# Patient Record
Sex: MALE | Race: WHITE | URBAN - METROPOLITAN AREA
[De-identification: names, ages, dates, MRNs, and addresses within clinical notes are randomized per-mention and may not be internally consistent; named-entity substitution may affect disease eponyms.]

---

## 2020-07-06 ENCOUNTER — TELEPHONE ENCOUNTER (OUTPATIENT)
Dept: URBAN - METROPOLITAN AREA CLINIC 105 | Facility: CLINIC | Age: 75
End: 2020-07-06

## 2020-07-29 ENCOUNTER — OFFICE VISIT (OUTPATIENT)
Dept: URBAN - METROPOLITAN AREA TELEHEALTH 2 | Facility: TELEHEALTH | Age: 75
End: 2020-07-29

## 2020-08-04 ENCOUNTER — OFFICE VISIT (OUTPATIENT)
Dept: URBAN - METROPOLITAN AREA TELEHEALTH 2 | Facility: TELEHEALTH | Age: 75
End: 2020-08-04

## 2020-08-07 ENCOUNTER — OFFICE VISIT (OUTPATIENT)
Dept: URBAN - METROPOLITAN AREA CLINIC 105 | Facility: CLINIC | Age: 75
End: 2020-08-07

## 2020-10-28 ENCOUNTER — OUT OF OFFICE VISIT (OUTPATIENT)
Dept: URBAN - METROPOLITAN AREA MEDICAL CENTER 33 | Facility: MEDICAL CENTER | Age: 75
End: 2020-10-28
Payer: MEDICARE

## 2020-10-28 DIAGNOSIS — N17.8 OTHER ACUTE KIDNEY FAILURE: ICD-10-CM

## 2020-10-28 DIAGNOSIS — D64.89 ANEMIA DUE TO OTHER CAUSE: ICD-10-CM

## 2020-10-28 DIAGNOSIS — K92.0 BLOODY EMESIS: ICD-10-CM

## 2020-10-28 PROCEDURE — 99223 1ST HOSP IP/OBS HIGH 75: CPT | Performed by: INTERNAL MEDICINE

## 2020-10-28 PROCEDURE — 99233 SBSQ HOSP IP/OBS HIGH 50: CPT | Performed by: INTERNAL MEDICINE

## 2020-10-28 PROCEDURE — G8427 DOCREV CUR MEDS BY ELIG CLIN: HCPCS | Performed by: INTERNAL MEDICINE

## 2020-10-29 ENCOUNTER — OUT OF OFFICE VISIT (OUTPATIENT)
Dept: URBAN - METROPOLITAN AREA MEDICAL CENTER 33 | Facility: MEDICAL CENTER | Age: 75
End: 2020-10-29
Payer: MEDICARE

## 2020-10-29 DIAGNOSIS — K31.89 ACQUIRED DEFORMITY OF DUODENUM: ICD-10-CM

## 2020-10-29 DIAGNOSIS — K22.2 ACQUIRED ESOPHAGEAL RING: ICD-10-CM

## 2020-10-29 PROCEDURE — 43239 EGD BIOPSY SINGLE/MULTIPLE: CPT | Performed by: INTERNAL MEDICINE

## 2020-10-29 PROCEDURE — 43249 ESOPH EGD DILATION <30 MM: CPT | Performed by: INTERNAL MEDICINE

## 2020-11-23 ENCOUNTER — TELEPHONE ENCOUNTER (OUTPATIENT)
Dept: URBAN - METROPOLITAN AREA CLINIC 92 | Facility: CLINIC | Age: 75
End: 2020-11-23

## 2020-12-02 ENCOUNTER — OUT OF OFFICE VISIT (OUTPATIENT)
Dept: URBAN - METROPOLITAN AREA MEDICAL CENTER 33 | Facility: MEDICAL CENTER | Age: 75
End: 2020-12-02
Payer: MEDICARE

## 2020-12-02 DIAGNOSIS — K21.9 ACID REFLUX: ICD-10-CM

## 2020-12-02 DIAGNOSIS — K59.09 CHRONIC CONSTIPATION: ICD-10-CM

## 2020-12-02 DIAGNOSIS — Z87.19 H/O DIVERTICULITIS OF COLON: ICD-10-CM

## 2020-12-02 DIAGNOSIS — R18.8 ABDOMINAL FLUID COLLECTION: ICD-10-CM

## 2020-12-02 PROCEDURE — 99223 1ST HOSP IP/OBS HIGH 75: CPT | Performed by: INTERNAL MEDICINE

## 2020-12-02 PROCEDURE — G8427 DOCREV CUR MEDS BY ELIG CLIN: HCPCS | Performed by: INTERNAL MEDICINE

## 2020-12-03 ENCOUNTER — OUT OF OFFICE VISIT (OUTPATIENT)
Dept: URBAN - METROPOLITAN AREA MEDICAL CENTER 33 | Facility: MEDICAL CENTER | Age: 75
End: 2020-12-03
Payer: MEDICARE

## 2020-12-03 DIAGNOSIS — Z87.19 H/O ACUTE PANCREATITIS: ICD-10-CM

## 2020-12-03 DIAGNOSIS — K21.9 ACID REFLUX: ICD-10-CM

## 2020-12-03 PROCEDURE — 43249 ESOPH EGD DILATION <30 MM: CPT | Performed by: INTERNAL MEDICINE

## 2020-12-11 ENCOUNTER — TELEPHONE ENCOUNTER (OUTPATIENT)
Dept: URBAN - METROPOLITAN AREA CLINIC 92 | Facility: CLINIC | Age: 75
End: 2020-12-11

## 2020-12-15 ENCOUNTER — TELEPHONE ENCOUNTER (OUTPATIENT)
Dept: URBAN - METROPOLITAN AREA CLINIC 105 | Facility: CLINIC | Age: 75
End: 2020-12-15

## 2020-12-15 ENCOUNTER — OFFICE VISIT (OUTPATIENT)
Dept: URBAN - METROPOLITAN AREA TELEHEALTH 2 | Facility: TELEHEALTH | Age: 75
End: 2020-12-15
Payer: MEDICARE

## 2020-12-15 VITALS — WEIGHT: 197.8 LBS | HEIGHT: 72 IN | BODY MASS INDEX: 26.79 KG/M2

## 2020-12-15 DIAGNOSIS — I25.9 CAD (CORONARY ARTERY DISEASE): ICD-10-CM

## 2020-12-15 DIAGNOSIS — N18.9 CKD (CHRONIC KIDNEY DISEASE): ICD-10-CM

## 2020-12-15 DIAGNOSIS — R18.8 ASCITES: ICD-10-CM

## 2020-12-15 DIAGNOSIS — K21.9 GERD: ICD-10-CM

## 2020-12-15 PROBLEM — 53741008 CORONARY ARTERY DISEASE: Status: ACTIVE | Noted: 2020-12-15

## 2020-12-15 PROBLEM — 389026000 ASCITES: Status: ACTIVE | Noted: 2020-12-15

## 2020-12-15 PROBLEM — 267440005 MONOCLONAL PARAPROTEINEMIA: Status: ACTIVE | Noted: 2020-12-15

## 2020-12-15 PROBLEM — 14760008 CONSTIPATION: Status: ACTIVE | Noted: 2020-12-15

## 2020-12-15 PROBLEM — 709044004 CHRONIC KIDNEY DISEASE: Status: ACTIVE | Noted: 2020-12-15

## 2020-12-15 PROBLEM — 63305008 ESOPHAGEAL STRICTURE: Status: ACTIVE | Noted: 2020-12-15

## 2020-12-15 PROCEDURE — 3017F COLORECTAL CA SCREEN DOC REV: CPT | Performed by: INTERNAL MEDICINE

## 2020-12-15 PROCEDURE — 1036F TOBACCO NON-USER: CPT | Performed by: INTERNAL MEDICINE

## 2020-12-15 PROCEDURE — 99214 OFFICE O/P EST MOD 30 MIN: CPT | Performed by: INTERNAL MEDICINE

## 2020-12-15 PROCEDURE — G8417 CALC BMI ABV UP PARAM F/U: HCPCS | Performed by: INTERNAL MEDICINE

## 2020-12-15 PROCEDURE — G8427 DOCREV CUR MEDS BY ELIG CLIN: HCPCS | Performed by: INTERNAL MEDICINE

## 2020-12-15 PROCEDURE — G8482 FLU IMMUNIZE ORDER/ADMIN: HCPCS | Performed by: INTERNAL MEDICINE

## 2020-12-15 PROCEDURE — G9903 PT SCRN TBCO ID AS NON USER: HCPCS | Performed by: INTERNAL MEDICINE

## 2020-12-15 RX ORDER — DOCUSATE SODIUM 100 MG/1
TAKE 1 CAPSULE (100 MG) BY ORAL ROUTE ONCE DAILY CAPSULE ORAL 1
Qty: 0 | Refills: 0 | Status: ACTIVE | COMMUNITY
Start: 1900-01-01

## 2020-12-15 RX ORDER — LEVOTHYROXINE SODIUM 25 UG/1
TAKE ONE-HALF TABLET (12.5 MCG) BY ORAL ROUTE ONCE DAILY TABLET ORAL 1
Qty: 0 | Refills: 0 | Status: ACTIVE | COMMUNITY
Start: 1900-01-01

## 2020-12-15 RX ORDER — FLUTICASONE FUROATE AND VILANTEROL TRIFENATATE 100; 25 UG/1; UG/1
INHALE 1 PUFF BY INHALATION ROUTE ONCE DAILY AT THE SAME TIME EACH DAY POWDER RESPIRATORY (INHALATION) 1
Qty: 1 | Refills: 0 | Status: ACTIVE | COMMUNITY
Start: 1900-01-01

## 2020-12-15 RX ORDER — ONDANSETRON 4 MG/1
TABLET, ORALLY DISINTEGRATING ORAL
Qty: 0 | Refills: 0 | Status: ACTIVE | COMMUNITY
Start: 1900-01-01

## 2020-12-15 RX ORDER — IPRATROPIUM BROMIDE 0.5 MG/2.5ML
SOLUTION RESPIRATORY (INHALATION)
Qty: 0 | Refills: 0 | Status: ACTIVE | COMMUNITY
Start: 1900-01-01

## 2020-12-15 RX ORDER — ATORVASTATIN CALCIUM 40 MG/1
TAKE 1 TABLET (40 MG) BY ORAL ROUTE ONCE DAILY AT BEDTIME TABLET, FILM COATED ORAL 1
Qty: 0 | Refills: 0 | Status: ACTIVE | COMMUNITY
Start: 1900-01-01

## 2020-12-15 RX ORDER — MODAFINIL 200 MG/1
TAKE 1 TABLET (200 MG) BY ORAL ROUTE ONCE DAILY IN THE MORNING TABLET ORAL 1
Qty: 0 | Refills: 0 | Status: ACTIVE | COMMUNITY
Start: 1900-01-01

## 2020-12-15 RX ORDER — OXYCODONE AND ACETAMINOPHEN 10; 325 MG/1; MG/1
TAKE 1 TABLET BY ORAL ROUTE EVERY 6 HOURS AS NEEDED TABLET ORAL
Qty: 0 | Refills: 0 | Status: ACTIVE | COMMUNITY
Start: 1900-01-01

## 2020-12-15 RX ORDER — MAGNESIUM HYDROXIDE 400 MG/5ML
SUSPENSION ORAL
Qty: 0 | Refills: 0 | Status: ACTIVE | COMMUNITY
Start: 1900-01-01

## 2020-12-15 RX ORDER — AMIODARONE HYDROCHLORIDE 200 MG/1
TAKE 1 TABLET (200 MG) BY ORAL ROUTE ONCE DAILY FOR 30 DAYS TABLET ORAL 1
Qty: 30 | Refills: 0 | Status: ACTIVE | COMMUNITY
Start: 1900-01-01

## 2020-12-15 RX ORDER — PANTOPRAZOLE SODIUM 40 MG/1
TAKE 1 TABLET EVERY DAY TABLET, DELAYED RELEASE ORAL
Qty: 90 | Refills: 0 | Status: ACTIVE | COMMUNITY
Start: 2019-11-14

## 2020-12-15 RX ORDER — NITROGLYCERIN 0.4 MG/1
TABLET SUBLINGUAL
Qty: 0 | Refills: 0 | Status: ACTIVE | COMMUNITY
Start: 1900-01-01

## 2020-12-15 RX ORDER — PANTOPRAZOLE SODIUM 40 MG/1
TAKE 1 TABLET EVERY DAY TABLET, DELAYED RELEASE ORAL
Qty: 90 | Refills: 2
Start: 2019-11-14

## 2020-12-15 RX ORDER — MIDODRINE HYDROCHLORIDE 10 MG/1
TAKE 1 TABLET (10 MG) BY ORAL ROUTE 3 TIMES PER DAY TABLET ORAL
Qty: 0 | Refills: 0 | Status: ACTIVE | COMMUNITY
Start: 1900-01-01

## 2020-12-15 RX ORDER — INSULIN DETEMIR 100 [IU]/ML
INJECT BY SUBCUTANEOUS ROUTE PER PRESCRIBER'S INSTRUCTIONS. INSULIN DOSING REQUIRES INDIVIDUALIZATION INJECTION, SOLUTION SUBCUTANEOUS
Qty: 1 | Refills: 0 | Status: ACTIVE | COMMUNITY
Start: 1900-01-01

## 2020-12-15 RX ORDER — GABAPENTIN 100 MG/1
TAKE 1 CAPSULE (100 MG) BY ORAL ROUTE ONCE DAILY CAPSULE ORAL 1
Qty: 0 | Refills: 0 | Status: ACTIVE | COMMUNITY
Start: 1900-01-01

## 2020-12-15 RX ORDER — EZETIMIBE 10 MG/1
TAKE 1 TABLET (10 MG) BY ORAL ROUTE ONCE DAILY TABLET ORAL 1
Qty: 0 | Refills: 0 | Status: ACTIVE | COMMUNITY
Start: 1900-01-01

## 2020-12-15 RX ORDER — ALPRAZOLAM 0.5 MG
TAKE 1 TABLET (0.5 MG) BY ORAL ROUTE 3 TIMES PER DAY TABLET ORAL
Qty: 0 | Refills: 0 | Status: ACTIVE | COMMUNITY
Start: 1900-01-01

## 2020-12-15 RX ORDER — ASPIRIN 81 MG/1
TAKE 1 TABLET (81 MG) BY ORAL ROUTE ONCE DAILY TABLET, COATED ORAL 1
Qty: 0 | Refills: 0 | Status: ACTIVE | COMMUNITY
Start: 1900-01-01

## 2020-12-15 RX ORDER — FUROSEMIDE 100 MG/10ML
TAKE 2 MILLILITERS (20 MG) BY ORAL ROUTE ONCE DAILY INJECTION INTRAMUSCULAR; INTRAVENOUS 1
Qty: 0 | Refills: 0 | Status: ACTIVE | COMMUNITY
Start: 1900-01-01

## 2020-12-15 RX ORDER — BROMOCRIPTINE MESYLATE 2.5 MG/1
TABLET ORAL
Qty: 0 | Refills: 0 | Status: ACTIVE | COMMUNITY
Start: 1900-01-01

## 2020-12-15 RX ORDER — FLUCONAZOLE 100 MG/1
TAKE 1 TABLET (100 MG) BY ORAL ROUTE ONCE DAILY TABLET ORAL 1
Qty: 0 | Refills: 0 | Status: ACTIVE | COMMUNITY
Start: 1900-01-01

## 2020-12-15 RX ORDER — POTASSIUM CHLORIDE 1.5 G/15ML
SOLUTION ORAL
Qty: 0 | Refills: 0 | Status: ACTIVE | COMMUNITY
Start: 1900-01-01

## 2020-12-15 NOTE — HPI-TODAY'S VISIT:
the patient comes to the office by telehealth today.  He was just in the hospital for volume overload.  I did upper GI endoscopy while he was admitted there.  He needed a transesophageal echocardiogram.  He has bad valvular heart disease which is creating problems with congestive heart failure.  He also has accumulated cardiac ascites which was drained in the hospital.  The patient's eye is doing well from a GI perspective.  He continues on pantoprazole and states he needs a refill on that.  He does have some problems with chronic kidney disease.  In the hospital it was determined that he may have some issues with multiple myeloma.  He is seeing Hematology about that.  He did undergo a bone survey along with a bone marrow aspirate and biopsy.

## 2021-03-16 ENCOUNTER — OFFICE VISIT (OUTPATIENT)
Dept: URBAN - METROPOLITAN AREA CLINIC 105 | Facility: CLINIC | Age: 76
End: 2021-03-16
Payer: MEDICARE

## 2021-03-16 VITALS
BODY MASS INDEX: 23.76 KG/M2 | SYSTOLIC BLOOD PRESSURE: 116 MMHG | WEIGHT: 175.4 LBS | DIASTOLIC BLOOD PRESSURE: 69 MMHG | HEART RATE: 88 BPM | HEIGHT: 72 IN | TEMPERATURE: 96.9 F

## 2021-03-16 DIAGNOSIS — Z98.890 HISTORY OF TRACHEOSTOMY: ICD-10-CM

## 2021-03-16 DIAGNOSIS — E11.51 TYPE 2 DIABETES MELLITUS WITH DIABETIC PERIPHERAL ANGIOPATHY WITHOUT GANGRENE: ICD-10-CM

## 2021-03-16 DIAGNOSIS — I38 VALVULAR HEART DISEASE: ICD-10-CM

## 2021-03-16 DIAGNOSIS — N18.9 CHRONIC KIDNEY DISEASE, UNSPECIFIED CKD STAGE: ICD-10-CM

## 2021-03-16 DIAGNOSIS — Z79.4 LONG TERM (CURRENT) USE OF INSULIN: ICD-10-CM

## 2021-03-16 PROBLEM — 710815001: Status: ACTIVE | Noted: 2021-03-16

## 2021-03-16 PROBLEM — 709044004: Status: ACTIVE | Noted: 2021-03-16

## 2021-03-16 PROBLEM — 314902007: Status: ACTIVE | Noted: 2021-03-16

## 2021-03-16 PROCEDURE — 99214 OFFICE O/P EST MOD 30 MIN: CPT | Performed by: INTERNAL MEDICINE

## 2021-03-16 RX ORDER — EZETIMIBE 10 MG/1
TAKE 1 TABLET (10 MG) BY ORAL ROUTE ONCE DAILY TABLET ORAL 1
Qty: 0 | Refills: 0 | Status: ACTIVE | COMMUNITY
Start: 1900-01-01

## 2021-03-16 RX ORDER — NITROGLYCERIN 0.4 MG/1
TABLET SUBLINGUAL
Qty: 0 | Refills: 0 | Status: ACTIVE | COMMUNITY
Start: 1900-01-01

## 2021-03-16 RX ORDER — FLUTICASONE FUROATE AND VILANTEROL TRIFENATATE 100; 25 UG/1; UG/1
INHALE 1 PUFF BY INHALATION ROUTE ONCE DAILY AT THE SAME TIME EACH DAY POWDER RESPIRATORY (INHALATION) 1
Qty: 1 | Refills: 0 | Status: ACTIVE | COMMUNITY
Start: 1900-01-01

## 2021-03-16 RX ORDER — ONDANSETRON 4 MG/1
TABLET, ORALLY DISINTEGRATING ORAL
Qty: 0 | Refills: 0 | Status: ACTIVE | COMMUNITY
Start: 1900-01-01

## 2021-03-16 RX ORDER — PANTOPRAZOLE SODIUM 40 MG/1
TAKE 1 TABLET EVERY DAY TABLET, DELAYED RELEASE ORAL
Qty: 90 | Refills: 2 | Status: ACTIVE | COMMUNITY
Start: 2019-11-14

## 2021-03-16 RX ORDER — OXYCODONE AND ACETAMINOPHEN 10; 325 MG/1; MG/1
TAKE 1 TABLET BY ORAL ROUTE EVERY 6 HOURS AS NEEDED TABLET ORAL
Qty: 0 | Refills: 0 | Status: ACTIVE | COMMUNITY
Start: 1900-01-01

## 2021-03-16 RX ORDER — POTASSIUM CHLORIDE 1.5 G/15ML
SOLUTION ORAL
Qty: 0 | Refills: 0 | Status: ACTIVE | COMMUNITY
Start: 1900-01-01

## 2021-03-16 RX ORDER — FLUCONAZOLE 100 MG/1
TAKE 1 TABLET (100 MG) BY ORAL ROUTE ONCE DAILY TABLET ORAL 1
Qty: 0 | Refills: 0 | Status: ACTIVE | COMMUNITY
Start: 1900-01-01

## 2021-03-16 RX ORDER — MIDODRINE HYDROCHLORIDE 10 MG/1
TAKE 1 TABLET (10 MG) BY ORAL ROUTE 3 TIMES PER DAY TABLET ORAL
Qty: 0 | Refills: 0 | Status: ACTIVE | COMMUNITY
Start: 1900-01-01

## 2021-03-16 RX ORDER — IPRATROPIUM BROMIDE 0.5 MG/2.5ML
SOLUTION RESPIRATORY (INHALATION)
Qty: 0 | Refills: 0 | Status: ACTIVE | COMMUNITY
Start: 1900-01-01

## 2021-03-16 RX ORDER — DOCUSATE SODIUM 100 MG/1
TAKE 1 CAPSULE (100 MG) BY ORAL ROUTE ONCE DAILY CAPSULE ORAL 1
Qty: 0 | Refills: 0 | Status: ACTIVE | COMMUNITY
Start: 1900-01-01

## 2021-03-16 RX ORDER — ATORVASTATIN CALCIUM 40 MG/1
TAKE 1 TABLET (40 MG) BY ORAL ROUTE ONCE DAILY AT BEDTIME TABLET, FILM COATED ORAL 1
Qty: 0 | Refills: 0 | Status: ACTIVE | COMMUNITY
Start: 1900-01-01

## 2021-03-16 RX ORDER — MODAFINIL 200 MG/1
TAKE 1 TABLET (200 MG) BY ORAL ROUTE ONCE DAILY IN THE MORNING TABLET ORAL 1
Qty: 0 | Refills: 0 | Status: ACTIVE | COMMUNITY
Start: 1900-01-01

## 2021-03-16 RX ORDER — FUROSEMIDE 100 MG/10ML
TAKE 2 MILLILITERS (20 MG) BY ORAL ROUTE ONCE DAILY INJECTION INTRAMUSCULAR; INTRAVENOUS 1
Qty: 0 | Refills: 0 | Status: ACTIVE | COMMUNITY
Start: 1900-01-01

## 2021-03-16 RX ORDER — LEVOTHYROXINE SODIUM 25 UG/1
TAKE ONE-HALF TABLET (12.5 MCG) BY ORAL ROUTE ONCE DAILY TABLET ORAL 1
Qty: 0 | Refills: 0 | Status: ACTIVE | COMMUNITY
Start: 1900-01-01

## 2021-03-16 RX ORDER — ASPIRIN 81 MG/1
TAKE 1 TABLET (81 MG) BY ORAL ROUTE ONCE DAILY TABLET, COATED ORAL 1
Qty: 0 | Refills: 0 | Status: ACTIVE | COMMUNITY
Start: 1900-01-01

## 2021-03-16 RX ORDER — INSULIN DETEMIR 100 [IU]/ML
INJECT BY SUBCUTANEOUS ROUTE PER PRESCRIBER'S INSTRUCTIONS. INSULIN DOSING REQUIRES INDIVIDUALIZATION INJECTION, SOLUTION SUBCUTANEOUS
Qty: 1 | Refills: 0 | Status: ACTIVE | COMMUNITY
Start: 1900-01-01

## 2021-03-16 RX ORDER — ALPRAZOLAM 0.5 MG
TAKE 1 TABLET (0.5 MG) BY ORAL ROUTE 3 TIMES PER DAY TABLET ORAL
Qty: 0 | Refills: 0 | Status: ACTIVE | COMMUNITY
Start: 1900-01-01

## 2021-03-16 RX ORDER — AMIODARONE HYDROCHLORIDE 200 MG/1
TAKE 1 TABLET (200 MG) BY ORAL ROUTE ONCE DAILY FOR 30 DAYS TABLET ORAL 1
Qty: 30 | Refills: 0 | Status: ACTIVE | COMMUNITY
Start: 1900-01-01

## 2021-03-16 RX ORDER — MAGNESIUM HYDROXIDE 400 MG/5ML
SUSPENSION ORAL
Qty: 0 | Refills: 0 | Status: ACTIVE | COMMUNITY
Start: 1900-01-01

## 2021-03-16 RX ORDER — GABAPENTIN 100 MG/1
TAKE 1 CAPSULE (100 MG) BY ORAL ROUTE ONCE DAILY CAPSULE ORAL 1
Qty: 0 | Refills: 0 | Status: ACTIVE | COMMUNITY
Start: 1900-01-01

## 2021-03-16 RX ORDER — BROMOCRIPTINE MESYLATE 2.5 MG/1
TABLET ORAL
Qty: 0 | Refills: 0 | Status: ACTIVE | COMMUNITY
Start: 1900-01-01

## 2021-03-23 ENCOUNTER — TELEPHONE ENCOUNTER (OUTPATIENT)
Dept: URBAN - METROPOLITAN AREA CLINIC 105 | Facility: CLINIC | Age: 76
End: 2021-03-23

## 2021-03-23 RX ORDER — PANTOPRAZOLE SODIUM 40 MG/1
TAKE 1 TABLET EVERY DAY TABLET, DELAYED RELEASE ORAL
Qty: 90 | Refills: 2
Start: 2019-11-14

## 2021-05-24 ENCOUNTER — WEB ENCOUNTER (OUTPATIENT)
Dept: URBAN - METROPOLITAN AREA CLINIC 105 | Facility: CLINIC | Age: 76
End: 2021-05-24

## 2021-05-24 RX ORDER — PANTOPRAZOLE SODIUM 40 MG/1
TAKE 1 TABLET EVERY DAY TABLET, DELAYED RELEASE ORAL ONCE A DAY
Qty: 90 TABLET | Refills: 3

## 2021-05-25 ENCOUNTER — TELEPHONE ENCOUNTER (OUTPATIENT)
Dept: URBAN - METROPOLITAN AREA CLINIC 105 | Facility: CLINIC | Age: 76
End: 2021-05-25

## 2021-05-25 RX ORDER — PANTOPRAZOLE SODIUM 40 MG/1
TAKE 1 TABLET EVERY DAY TABLET, DELAYED RELEASE ORAL ONCE A DAY
Qty: 90 TABLET | Refills: 3

## 2021-11-19 ENCOUNTER — ERX REFILL RESPONSE (OUTPATIENT)
Dept: RURAL CLINIC 2 | Facility: CLINIC | Age: 76
End: 2021-11-19

## 2021-11-19 RX ORDER — PANTOPRAZOLE SODIUM 40 MG/1
TAKE 1 TABLET EVERY DAY TABLET, DELAYED RELEASE ORAL
Qty: 90 TABLET | Refills: 0 | OUTPATIENT

## 2021-11-19 RX ORDER — PANTOPRAZOLE SODIUM 40 MG/1
TAKE 1 TABLET EVERY DAY TABLET, DELAYED RELEASE ORAL
Qty: 90 TABLET | Refills: 4 | OUTPATIENT

## 2022-03-28 ENCOUNTER — WEB ENCOUNTER (OUTPATIENT)
Dept: URBAN - METROPOLITAN AREA CLINIC 105 | Facility: CLINIC | Age: 77
End: 2022-03-28

## 2022-03-31 ENCOUNTER — WEB ENCOUNTER (OUTPATIENT)
Dept: URBAN - METROPOLITAN AREA CLINIC 105 | Facility: CLINIC | Age: 77
End: 2022-03-31

## 2022-03-31 ENCOUNTER — TELEPHONE ENCOUNTER (OUTPATIENT)
Dept: URBAN - METROPOLITAN AREA CLINIC 105 | Facility: CLINIC | Age: 77
End: 2022-03-31

## 2022-03-31 ENCOUNTER — LAB OUTSIDE AN ENCOUNTER (OUTPATIENT)
Dept: URBAN - METROPOLITAN AREA CLINIC 105 | Facility: CLINIC | Age: 77
End: 2022-03-31

## 2022-03-31 ENCOUNTER — OFFICE VISIT (OUTPATIENT)
Dept: URBAN - METROPOLITAN AREA CLINIC 105 | Facility: CLINIC | Age: 77
End: 2022-03-31
Payer: MEDICARE

## 2022-03-31 ENCOUNTER — DASHBOARD ENCOUNTERS (OUTPATIENT)
Age: 77
End: 2022-03-31

## 2022-03-31 VITALS
HEART RATE: 99 BPM | BODY MASS INDEX: 26.57 KG/M2 | TEMPERATURE: 98.1 F | HEIGHT: 72 IN | SYSTOLIC BLOOD PRESSURE: 125 MMHG | WEIGHT: 196.2 LBS | DIASTOLIC BLOOD PRESSURE: 73 MMHG

## 2022-03-31 DIAGNOSIS — I38 VALVULAR HEART DISEASE: ICD-10-CM

## 2022-03-31 DIAGNOSIS — K21.9 GERD WITHOUT ESOPHAGITIS: ICD-10-CM

## 2022-03-31 DIAGNOSIS — I10 HTN (HYPERTENSION), BENIGN: ICD-10-CM

## 2022-03-31 DIAGNOSIS — E03.9 ACQUIRED HYPOTHYROIDISM: ICD-10-CM

## 2022-03-31 DIAGNOSIS — R18.8 OTHER ASCITES: ICD-10-CM

## 2022-03-31 DIAGNOSIS — Z95.0 PACEMAKER: ICD-10-CM

## 2022-03-31 DIAGNOSIS — D69.6 THROMBOCYTOPENIA: ICD-10-CM

## 2022-03-31 DIAGNOSIS — I25.10 CORONARY ARTERY DISEASE INVOLVING NATIVE CORONARY ARTERY OF NATIVE HEART WITHOUT ANGINA PECTORIS: ICD-10-CM

## 2022-03-31 DIAGNOSIS — E72.20 HYPERAMMONEMIA: ICD-10-CM

## 2022-03-31 DIAGNOSIS — K72.90 HEPATIC ENCEPHALOPATHY: ICD-10-CM

## 2022-03-31 PROBLEM — 389026000: Status: ACTIVE | Noted: 2022-03-31

## 2022-03-31 PROBLEM — 111566002: Status: ACTIVE | Noted: 2022-03-31

## 2022-03-31 PROBLEM — 9360008: Status: ACTIVE | Noted: 2022-03-31

## 2022-03-31 PROBLEM — 441509002: Status: ACTIVE | Noted: 2022-03-31

## 2022-03-31 PROBLEM — 415116008: Status: ACTIVE | Noted: 2022-03-31

## 2022-03-31 PROBLEM — 368009 VALVULAR HEART DISEASE: Status: ACTIVE | Noted: 2020-12-15

## 2022-03-31 PROBLEM — 443502000: Status: ACTIVE | Noted: 2022-03-31

## 2022-03-31 PROBLEM — 10725009: Status: ACTIVE | Noted: 2022-03-31

## 2022-03-31 PROBLEM — 266435005: Status: ACTIVE | Noted: 2022-03-31

## 2022-03-31 PROCEDURE — 99215 OFFICE O/P EST HI 40 MIN: CPT | Performed by: INTERNAL MEDICINE

## 2022-03-31 RX ORDER — ALPRAZOLAM 0.5 MG
TAKE 1 TABLET (0.5 MG) BY ORAL ROUTE 3 TIMES PER DAY TABLET ORAL
Qty: 0 | Refills: 0 | Status: ON HOLD | COMMUNITY
Start: 1900-01-01

## 2022-03-31 RX ORDER — MODAFINIL 200 MG/1
TAKE 1 TABLET (200 MG) BY ORAL ROUTE ONCE DAILY IN THE MORNING TABLET ORAL 1
Qty: 0 | Refills: 0 | Status: ON HOLD | COMMUNITY
Start: 1900-01-01

## 2022-03-31 RX ORDER — ATORVASTATIN CALCIUM 40 MG/1
TAKE 1 TABLET (40 MG) BY ORAL ROUTE ONCE DAILY AT BEDTIME TABLET, FILM COATED ORAL 1
Qty: 0 | Refills: 0 | Status: ON HOLD | COMMUNITY
Start: 1900-01-01

## 2022-03-31 RX ORDER — LEVOTHYROXINE SODIUM 25 UG/1
TAKE ONE-HALF TABLET (12.5 MCG) BY ORAL ROUTE ONCE DAILY TABLET ORAL 1
Qty: 0 | Refills: 0 | Status: ON HOLD | COMMUNITY
Start: 1900-01-01

## 2022-03-31 RX ORDER — EZETIMIBE 10 MG/1
TAKE 1 TABLET (10 MG) BY ORAL ROUTE ONCE DAILY TABLET ORAL 1
Qty: 0 | Refills: 0 | Status: ON HOLD | COMMUNITY
Start: 1900-01-01

## 2022-03-31 RX ORDER — MIDODRINE HYDROCHLORIDE 10 MG/1
TAKE 1 TABLET (10 MG) BY ORAL ROUTE 3 TIMES PER DAY TABLET ORAL
Qty: 0 | Refills: 0 | Status: ON HOLD | COMMUNITY
Start: 1900-01-01

## 2022-03-31 RX ORDER — BROMOCRIPTINE MESYLATE 2.5 MG/1
TABLET ORAL
Qty: 0 | Refills: 0 | Status: ON HOLD | COMMUNITY
Start: 1900-01-01

## 2022-03-31 RX ORDER — OXYCODONE AND ACETAMINOPHEN 10; 325 MG/1; MG/1
TAKE 1 TABLET BY ORAL ROUTE EVERY 6 HOURS AS NEEDED TABLET ORAL
Qty: 0 | Refills: 0 | Status: ON HOLD | COMMUNITY
Start: 1900-01-01

## 2022-03-31 RX ORDER — ONDANSETRON 4 MG/1
TABLET, ORALLY DISINTEGRATING ORAL
Qty: 0 | Refills: 0 | Status: ON HOLD | COMMUNITY
Start: 1900-01-01

## 2022-03-31 RX ORDER — DOCUSATE SODIUM 100 MG/1
TAKE 1 CAPSULE (100 MG) BY ORAL ROUTE ONCE DAILY CAPSULE ORAL 1
Qty: 0 | Refills: 0 | Status: ON HOLD | COMMUNITY
Start: 1900-01-01

## 2022-03-31 RX ORDER — MAGNESIUM HYDROXIDE 400 MG/5ML
SUSPENSION ORAL
Qty: 0 | Refills: 0 | Status: ON HOLD | COMMUNITY
Start: 1900-01-01

## 2022-03-31 RX ORDER — AMIODARONE HYDROCHLORIDE 200 MG/1
TAKE 1 TABLET (200 MG) BY ORAL ROUTE ONCE DAILY FOR 30 DAYS TABLET ORAL 1
Qty: 30 | Refills: 0 | Status: ON HOLD | COMMUNITY
Start: 1900-01-01

## 2022-03-31 RX ORDER — FUROSEMIDE 100 MG/10ML
TAKE 2 MILLILITERS (20 MG) BY ORAL ROUTE ONCE DAILY INJECTION INTRAMUSCULAR; INTRAVENOUS 1
Qty: 0 | Refills: 0 | Status: ON HOLD | COMMUNITY
Start: 1900-01-01

## 2022-03-31 RX ORDER — NITROGLYCERIN 0.4 MG/1
TABLET SUBLINGUAL
Qty: 0 | Refills: 0 | Status: ON HOLD | COMMUNITY
Start: 1900-01-01

## 2022-03-31 RX ORDER — GABAPENTIN 100 MG/1
TAKE 1 CAPSULE (100 MG) BY ORAL ROUTE ONCE DAILY CAPSULE ORAL 1
Qty: 0 | Refills: 0 | Status: ON HOLD | COMMUNITY
Start: 1900-01-01

## 2022-03-31 RX ORDER — FLUTICASONE FUROATE AND VILANTEROL TRIFENATATE 100; 25 UG/1; UG/1
INHALE 1 PUFF BY INHALATION ROUTE ONCE DAILY AT THE SAME TIME EACH DAY POWDER RESPIRATORY (INHALATION) 1
Qty: 1 | Refills: 0 | Status: ON HOLD | COMMUNITY
Start: 1900-01-01

## 2022-03-31 RX ORDER — PANTOPRAZOLE SODIUM 40 MG/1
1 TABLET TABLET, DELAYED RELEASE ORAL ONCE A DAY
Status: ACTIVE | COMMUNITY

## 2022-03-31 RX ORDER — ASPIRIN 81 MG/1
TAKE 1 TABLET (81 MG) BY ORAL ROUTE ONCE DAILY TABLET, COATED ORAL 1
Qty: 0 | Refills: 0 | Status: ON HOLD | COMMUNITY
Start: 1900-01-01

## 2022-03-31 RX ORDER — FLUCONAZOLE 100 MG/1
TAKE 1 TABLET (100 MG) BY ORAL ROUTE ONCE DAILY TABLET ORAL 1
Qty: 0 | Refills: 0 | Status: ON HOLD | COMMUNITY
Start: 1900-01-01

## 2022-03-31 RX ORDER — INSULIN DETEMIR 100 [IU]/ML
INJECT BY SUBCUTANEOUS ROUTE PER PRESCRIBER'S INSTRUCTIONS. INSULIN DOSING REQUIRES INDIVIDUALIZATION INJECTION, SOLUTION SUBCUTANEOUS
Qty: 1 | Refills: 0 | Status: ON HOLD | COMMUNITY
Start: 1900-01-01

## 2022-03-31 RX ORDER — LEVOTHYROXINE SODIUM 25 UG/1
1 TABLET IN THE MORNING ON AN EMPTY STOMACH TABLET ORAL ONCE A DAY
Status: ACTIVE | COMMUNITY

## 2022-03-31 RX ORDER — PANTOPRAZOLE SODIUM 40 MG/1
TAKE 1 TABLET EVERY DAY TABLET, DELAYED RELEASE ORAL
Qty: 90 TABLET | Refills: 4 | Status: ON HOLD | COMMUNITY

## 2022-03-31 RX ORDER — ACETAMINOPHEN 325 MG
1 CAPSULE TABLET ORAL ONCE A DAY
Status: ACTIVE | COMMUNITY

## 2022-03-31 RX ORDER — IPRATROPIUM BROMIDE 0.5 MG/2.5ML
SOLUTION RESPIRATORY (INHALATION)
Qty: 0 | Refills: 0 | Status: ON HOLD | COMMUNITY
Start: 1900-01-01

## 2022-03-31 RX ORDER — POTASSIUM CHLORIDE 1.5 G/15ML
SOLUTION ORAL
Qty: 0 | Refills: 0 | Status: ON HOLD | COMMUNITY
Start: 1900-01-01

## 2022-03-31 NOTE — HPI-TODAY'S VISIT:
- Pt comes to the office for follow up. He has CAD and valvular heart disease. 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 actually developed cardiac ascites. being evaluated for a pacemaker exchange. 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  Those issues have since been resolved.  He is doing better now -  03/31/2022: His wife comes with him to the office today.  She tells me that the patient has been having problems with excessive daytime sleepiness and somnolence.  Some body along the way check to serum ammonia level and it was 78.  They started him on chronically lack and apparently he woke up and is more alert and conversant.  He has no history of liver disease.  He did at 1 point developed ascites which we thought was cardiac ascites secondary to his severe valvular heart disease that subsequently required intervention by the Cardiology team. -   He was exposed to Agent Orange during his  service.  He has never been a drinker.

## 2022-04-15 ENCOUNTER — TELEPHONE ENCOUNTER (OUTPATIENT)
Dept: URBAN - METROPOLITAN AREA CLINIC 95 | Facility: CLINIC | Age: 77
End: 2022-04-15

## 2022-04-15 RX ORDER — LACTULOSE SOLUTION USP, 10 G/15 ML 10 G/15ML
15 ML SOLUTION ORAL; RECTAL THREE TIMES A DAY
Qty: 1350 ML | Refills: 4 | OUTPATIENT
Start: 2022-04-18 | End: 2022-09-15

## 2022-04-15 RX ORDER — RIFAXIMIN 550 MG/1
1 TABLET TABLET ORAL TWICE A DAY
Qty: 180 TABLET | Refills: 3 | OUTPATIENT

## 2022-05-06 ENCOUNTER — TELEPHONE ENCOUNTER (OUTPATIENT)
Dept: URBAN - METROPOLITAN AREA CLINIC 105 | Facility: CLINIC | Age: 77
End: 2022-05-06

## 2022-05-27 ENCOUNTER — OFFICE VISIT (OUTPATIENT)
Dept: URBAN - METROPOLITAN AREA MEDICAL CENTER 33 | Facility: MEDICAL CENTER | Age: 77
End: 2022-05-27
Payer: MEDICARE

## 2022-05-27 DIAGNOSIS — K76.89 ABNORMAL FINDING ON LIVER FUNCTION: ICD-10-CM

## 2022-05-27 DIAGNOSIS — K31.7 BENIGN GASTRIC POLYP: ICD-10-CM

## 2022-05-27 PROCEDURE — 43242 EGD US FINE NEEDLE BX/ASPIR: CPT | Performed by: INTERNAL MEDICINE

## 2022-05-27 PROCEDURE — 43239 EGD BIOPSY SINGLE/MULTIPLE: CPT | Performed by: INTERNAL MEDICINE

## 2022-05-27 RX ORDER — ONDANSETRON 4 MG/1
TABLET, ORALLY DISINTEGRATING ORAL
Qty: 0 | Refills: 0 | Status: ON HOLD | COMMUNITY
Start: 1900-01-01

## 2022-05-27 RX ORDER — PANTOPRAZOLE SODIUM 40 MG/1
1 TABLET TABLET, DELAYED RELEASE ORAL ONCE A DAY
Status: ACTIVE | COMMUNITY

## 2022-05-27 RX ORDER — MODAFINIL 200 MG/1
TAKE 1 TABLET (200 MG) BY ORAL ROUTE ONCE DAILY IN THE MORNING TABLET ORAL 1
Qty: 0 | Refills: 0 | Status: ON HOLD | COMMUNITY
Start: 1900-01-01

## 2022-05-27 RX ORDER — MIDODRINE HYDROCHLORIDE 10 MG/1
TAKE 1 TABLET (10 MG) BY ORAL ROUTE 3 TIMES PER DAY TABLET ORAL
Qty: 0 | Refills: 0 | Status: ON HOLD | COMMUNITY
Start: 1900-01-01

## 2022-05-27 RX ORDER — FUROSEMIDE 100 MG/10ML
TAKE 2 MILLILITERS (20 MG) BY ORAL ROUTE ONCE DAILY INJECTION INTRAMUSCULAR; INTRAVENOUS 1
Qty: 0 | Refills: 0 | Status: ON HOLD | COMMUNITY
Start: 1900-01-01

## 2022-05-27 RX ORDER — FLUTICASONE FUROATE AND VILANTEROL TRIFENATATE 100; 25 UG/1; UG/1
INHALE 1 PUFF BY INHALATION ROUTE ONCE DAILY AT THE SAME TIME EACH DAY POWDER RESPIRATORY (INHALATION) 1
Qty: 1 | Refills: 0 | Status: ON HOLD | COMMUNITY
Start: 1900-01-01

## 2022-05-27 RX ORDER — DOCUSATE SODIUM 100 MG/1
TAKE 1 CAPSULE (100 MG) BY ORAL ROUTE ONCE DAILY CAPSULE ORAL 1
Qty: 0 | Refills: 0 | Status: ON HOLD | COMMUNITY
Start: 1900-01-01

## 2022-05-27 RX ORDER — EZETIMIBE 10 MG/1
TAKE 1 TABLET (10 MG) BY ORAL ROUTE ONCE DAILY TABLET ORAL 1
Qty: 0 | Refills: 0 | Status: ON HOLD | COMMUNITY
Start: 1900-01-01

## 2022-05-27 RX ORDER — ATORVASTATIN CALCIUM 40 MG/1
TAKE 1 TABLET (40 MG) BY ORAL ROUTE ONCE DAILY AT BEDTIME TABLET, FILM COATED ORAL 1
Qty: 0 | Refills: 0 | Status: ON HOLD | COMMUNITY
Start: 1900-01-01

## 2022-05-27 RX ORDER — ASPIRIN 81 MG/1
TAKE 1 TABLET (81 MG) BY ORAL ROUTE ONCE DAILY TABLET, COATED ORAL 1
Qty: 0 | Refills: 0 | Status: ON HOLD | COMMUNITY
Start: 1900-01-01

## 2022-05-27 RX ORDER — NITROGLYCERIN 0.4 MG/1
TABLET SUBLINGUAL
Qty: 0 | Refills: 0 | Status: ON HOLD | COMMUNITY
Start: 1900-01-01

## 2022-05-27 RX ORDER — FLUCONAZOLE 100 MG/1
TAKE 1 TABLET (100 MG) BY ORAL ROUTE ONCE DAILY TABLET ORAL 1
Qty: 0 | Refills: 0 | Status: ON HOLD | COMMUNITY
Start: 1900-01-01

## 2022-05-27 RX ORDER — LEVOTHYROXINE SODIUM 25 UG/1
1 TABLET IN THE MORNING ON AN EMPTY STOMACH TABLET ORAL ONCE A DAY
Status: ACTIVE | COMMUNITY

## 2022-05-27 RX ORDER — PANTOPRAZOLE SODIUM 40 MG/1
TAKE 1 TABLET EVERY DAY TABLET, DELAYED RELEASE ORAL
Qty: 90 TABLET | Refills: 4 | Status: ON HOLD | COMMUNITY

## 2022-05-27 RX ORDER — BROMOCRIPTINE MESYLATE 2.5 MG/1
TABLET ORAL
Qty: 0 | Refills: 0 | Status: ON HOLD | COMMUNITY
Start: 1900-01-01

## 2022-05-27 RX ORDER — RIFAXIMIN 550 MG/1
1 TABLET TABLET ORAL TWICE A DAY
Qty: 180 TABLET | Refills: 3 | Status: ACTIVE | COMMUNITY

## 2022-05-27 RX ORDER — POTASSIUM CHLORIDE 1.5 G/15ML
SOLUTION ORAL
Qty: 0 | Refills: 0 | Status: ON HOLD | COMMUNITY
Start: 1900-01-01

## 2022-05-27 RX ORDER — OXYCODONE AND ACETAMINOPHEN 10; 325 MG/1; MG/1
TAKE 1 TABLET BY ORAL ROUTE EVERY 6 HOURS AS NEEDED TABLET ORAL
Qty: 0 | Refills: 0 | Status: ON HOLD | COMMUNITY
Start: 1900-01-01

## 2022-05-27 RX ORDER — ACETAMINOPHEN 325 MG
1 CAPSULE TABLET ORAL ONCE A DAY
Status: ACTIVE | COMMUNITY

## 2022-05-27 RX ORDER — INSULIN DETEMIR 100 [IU]/ML
INJECT BY SUBCUTANEOUS ROUTE PER PRESCRIBER'S INSTRUCTIONS. INSULIN DOSING REQUIRES INDIVIDUALIZATION INJECTION, SOLUTION SUBCUTANEOUS
Qty: 1 | Refills: 0 | Status: ON HOLD | COMMUNITY
Start: 1900-01-01

## 2022-05-27 RX ORDER — LACTULOSE SOLUTION USP, 10 G/15 ML 10 G/15ML
15 ML SOLUTION ORAL; RECTAL THREE TIMES A DAY
Qty: 1350 ML | Refills: 4 | Status: ACTIVE | COMMUNITY
Start: 2022-04-18 | End: 2022-09-15

## 2022-05-27 RX ORDER — ALPRAZOLAM 0.5 MG
TAKE 1 TABLET (0.5 MG) BY ORAL ROUTE 3 TIMES PER DAY TABLET ORAL
Qty: 0 | Refills: 0 | Status: ON HOLD | COMMUNITY
Start: 1900-01-01

## 2022-05-27 RX ORDER — AMIODARONE HYDROCHLORIDE 200 MG/1
TAKE 1 TABLET (200 MG) BY ORAL ROUTE ONCE DAILY FOR 30 DAYS TABLET ORAL 1
Qty: 30 | Refills: 0 | Status: ON HOLD | COMMUNITY
Start: 1900-01-01

## 2022-05-27 RX ORDER — LEVOTHYROXINE SODIUM 25 UG/1
TAKE ONE-HALF TABLET (12.5 MCG) BY ORAL ROUTE ONCE DAILY TABLET ORAL 1
Qty: 0 | Refills: 0 | Status: ON HOLD | COMMUNITY
Start: 1900-01-01

## 2022-05-27 RX ORDER — IPRATROPIUM BROMIDE 0.5 MG/2.5ML
SOLUTION RESPIRATORY (INHALATION)
Qty: 0 | Refills: 0 | Status: ON HOLD | COMMUNITY
Start: 1900-01-01

## 2022-05-27 RX ORDER — MAGNESIUM HYDROXIDE 400 MG/5ML
SUSPENSION ORAL
Qty: 0 | Refills: 0 | Status: ON HOLD | COMMUNITY
Start: 1900-01-01

## 2022-05-27 RX ORDER — GABAPENTIN 100 MG/1
TAKE 1 CAPSULE (100 MG) BY ORAL ROUTE ONCE DAILY CAPSULE ORAL 1
Qty: 0 | Refills: 0 | Status: ON HOLD | COMMUNITY
Start: 1900-01-01

## 2022-06-03 ENCOUNTER — TELEPHONE ENCOUNTER (OUTPATIENT)
Dept: URBAN - METROPOLITAN AREA CLINIC 105 | Facility: CLINIC | Age: 77
End: 2022-06-03

## 2022-06-04 ENCOUNTER — LAB OUTSIDE AN ENCOUNTER (OUTPATIENT)
Dept: URBAN - METROPOLITAN AREA CLINIC 105 | Facility: CLINIC | Age: 77
End: 2022-06-04

## 2022-06-04 PROBLEM — 446064000: Status: ACTIVE | Noted: 2022-06-04

## 2022-06-06 ENCOUNTER — TELEPHONE ENCOUNTER (OUTPATIENT)
Dept: URBAN - METROPOLITAN AREA CLINIC 111 | Facility: CLINIC | Age: 77
End: 2022-06-06

## 2022-06-09 ENCOUNTER — WEB ENCOUNTER (OUTPATIENT)
Dept: URBAN - METROPOLITAN AREA CLINIC 105 | Facility: CLINIC | Age: 77
End: 2022-06-09

## 2022-06-10 PROBLEM — 307132003: Status: ACTIVE | Noted: 2022-06-04

## 2022-06-16 ENCOUNTER — TELEPHONE ENCOUNTER (OUTPATIENT)
Dept: URBAN - METROPOLITAN AREA CLINIC 105 | Facility: CLINIC | Age: 77
End: 2022-06-16

## 2022-06-24 ENCOUNTER — TELEPHONE ENCOUNTER (OUTPATIENT)
Dept: URBAN - METROPOLITAN AREA CLINIC 105 | Facility: CLINIC | Age: 77
End: 2022-06-24

## 2022-07-08 ENCOUNTER — OFFICE VISIT (OUTPATIENT)
Dept: URBAN - METROPOLITAN AREA MEDICAL CENTER 33 | Facility: MEDICAL CENTER | Age: 77
End: 2022-07-08
Payer: MEDICARE

## 2022-07-08 DIAGNOSIS — K80.50 BILE DUCT CALCULUS: ICD-10-CM

## 2022-07-08 PROCEDURE — 43264 ERCP REMOVE DUCT CALCULI: CPT | Performed by: INTERNAL MEDICINE

## 2022-07-08 PROCEDURE — 43274 ERCP DUCT STENT PLACEMENT: CPT | Performed by: INTERNAL MEDICINE

## 2022-07-08 PROCEDURE — 74328 X-RAY BILE DUCT ENDOSCOPY: CPT | Performed by: INTERNAL MEDICINE

## 2022-07-09 ENCOUNTER — WEB ENCOUNTER (OUTPATIENT)
Dept: URBAN - METROPOLITAN AREA CLINIC 105 | Facility: CLINIC | Age: 77
End: 2022-07-09

## 2022-07-09 ENCOUNTER — OUT OF OFFICE VISIT (OUTPATIENT)
Dept: URBAN - METROPOLITAN AREA MEDICAL CENTER 1 | Facility: MEDICAL CENTER | Age: 77
End: 2022-07-09
Payer: MEDICARE

## 2022-07-09 DIAGNOSIS — K85.80 AUTOIMMUNE PANCREATITIS: ICD-10-CM

## 2022-07-09 DIAGNOSIS — K80.50 BILE DUCT CALCULUS: ICD-10-CM

## 2022-07-09 DIAGNOSIS — K91.89 AFFERENT LOOP SYNDROME: ICD-10-CM

## 2022-07-09 PROCEDURE — 99222 1ST HOSP IP/OBS MODERATE 55: CPT | Performed by: NURSE PRACTITIONER

## 2022-07-09 PROCEDURE — G8427 DOCREV CUR MEDS BY ELIG CLIN: HCPCS | Performed by: NURSE PRACTITIONER

## 2022-07-10 ENCOUNTER — OUT OF OFFICE VISIT (OUTPATIENT)
Dept: URBAN - METROPOLITAN AREA MEDICAL CENTER 1 | Facility: MEDICAL CENTER | Age: 77
End: 2022-07-10
Payer: MEDICARE

## 2022-07-10 DIAGNOSIS — K80.50 BILE DUCT CALCULUS: ICD-10-CM

## 2022-07-10 DIAGNOSIS — K91.89 AFFERENT LOOP SYNDROME: ICD-10-CM

## 2022-07-10 DIAGNOSIS — K85.80 AUTOIMMUNE PANCREATITIS: ICD-10-CM

## 2022-07-10 DIAGNOSIS — N17.9 ACUTE INJURY OF KIDNEY: ICD-10-CM

## 2022-07-10 PROCEDURE — 99232 SBSQ HOSP IP/OBS MODERATE 35: CPT | Performed by: INTERNAL MEDICINE

## 2022-07-11 ENCOUNTER — OUT OF OFFICE VISIT (OUTPATIENT)
Dept: URBAN - METROPOLITAN AREA MEDICAL CENTER 1 | Facility: MEDICAL CENTER | Age: 77
End: 2022-07-11
Payer: MEDICARE

## 2022-07-11 DIAGNOSIS — K80.50 BILE DUCT CALCULUS: ICD-10-CM

## 2022-07-11 DIAGNOSIS — K85.80 AUTOIMMUNE PANCREATITIS: ICD-10-CM

## 2022-07-11 DIAGNOSIS — K91.89 AFFERENT LOOP SYNDROME: ICD-10-CM

## 2022-07-11 DIAGNOSIS — N17.9 ACUTE INJURY OF KIDNEY: ICD-10-CM

## 2022-07-11 PROCEDURE — 99232 SBSQ HOSP IP/OBS MODERATE 35: CPT | Performed by: NURSE PRACTITIONER

## 2022-07-13 ENCOUNTER — WEB ENCOUNTER (OUTPATIENT)
Dept: URBAN - METROPOLITAN AREA CLINIC 105 | Facility: CLINIC | Age: 77
End: 2022-07-13

## 2023-08-22 NOTE — HPI-TODAY'S VISIT:
- Pt comes to the office for follow up. He has CAD and valvular heart disease. 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 actually developed cardiac ascites. being evaluated for a pacemaker exchange. 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  Those issues have since been resolved.  He is doing better now
Yes